# Patient Record
Sex: MALE | Race: WHITE | Employment: OTHER | URBAN - METROPOLITAN AREA
[De-identification: names, ages, dates, MRNs, and addresses within clinical notes are randomized per-mention and may not be internally consistent; named-entity substitution may affect disease eponyms.]

---

## 2021-11-22 ENCOUNTER — OFFICE VISIT (OUTPATIENT)
Dept: ORTHOPEDIC SURGERY | Age: 66
End: 2021-11-22
Payer: MEDICARE

## 2021-11-22 DIAGNOSIS — M79.641 RIGHT HAND PAIN: ICD-10-CM

## 2021-11-22 DIAGNOSIS — M65.311 TRIGGER FINGER OF RIGHT THUMB: Primary | ICD-10-CM

## 2021-11-22 PROCEDURE — 3017F COLORECTAL CA SCREEN DOC REV: CPT | Performed by: ORTHOPAEDIC SURGERY

## 2021-11-22 PROCEDURE — G8421 BMI NOT CALCULATED: HCPCS | Performed by: ORTHOPAEDIC SURGERY

## 2021-11-22 PROCEDURE — G8427 DOCREV CUR MEDS BY ELIG CLIN: HCPCS | Performed by: ORTHOPAEDIC SURGERY

## 2021-11-22 PROCEDURE — 99203 OFFICE O/P NEW LOW 30 MIN: CPT | Performed by: ORTHOPAEDIC SURGERY

## 2021-11-22 PROCEDURE — G8432 DEP SCR NOT DOC, RNG: HCPCS | Performed by: ORTHOPAEDIC SURGERY

## 2021-11-22 PROCEDURE — G8536 NO DOC ELDER MAL SCRN: HCPCS | Performed by: ORTHOPAEDIC SURGERY

## 2021-11-22 PROCEDURE — 1101F PT FALLS ASSESS-DOCD LE1/YR: CPT | Performed by: ORTHOPAEDIC SURGERY

## 2021-11-22 NOTE — PROGRESS NOTES
HPI: Shad Boswell (: 1955) is a 74 y.o. male, patient, here for evaluation of the following chief complaint(s):    New Patient (Right Trigger Thumb. Patient would like to discuss Cortisone Injection)  Patient is seen today to evaluate his right hand. He is a retired  and has been complaining of pain clicking and locking at night in his sleep involving his right thumb. He did not recall any specific fall or other injury. He localizes pain to the A1 pulley region or base of his thumb volarly. He denied any numbness or tingling is seen for further treatment of his right hand. Vitals: There were no vitals taken for this visit. There is no height or weight on file to calculate BMI. No Known Allergies    No current outpatient medications on file. No current facility-administered medications for this visit. History reviewed. No pertinent past medical history. History reviewed. No pertinent surgical history. Family History   Family history unknown: Yes        Social History     Tobacco Use    Smoking status: Never Smoker    Smokeless tobacco: Never Used   Substance Use Topics    Alcohol use: Never    Drug use: Not on file        Review of Systems    ROS     Negative for: Constitutional, Gastrointestinal, Neurological, Skin, Genitourinary, Musculoskeletal (Hand Pain), HENT, Endocrine, Cardiovascular, Eyes, Respiratory, Psychiatric, Allergic/Imm, Heme/Lymph    Last edited by Moiz Mendoza on 2021  2:41 PM. (History)             Physical Exam    Examination of the right wrist reveals no swelling, edema or warmth, no tenderness to palpation, no pain, normal strength and tone, normal range of motion, no crepitus, normal sensation, no instability or laxity and no known fractures or deformities.   Examination of the left wrist reveals no swelling, edema or warmth, no tenderness to palpation, no pain, normal strength and tone, normal range of motion, no crepitus, normal sensation, no instability or laxity and no known fractures or deformities. Examination of the left hand reveals no tenderness to palpation, no pain, normal  strength, normal tone, normal range of motion, no crepitus, no instability or laxity, no known fractures or deformities and normal sensation. Right hand is full range of motion with point tenderness at the A1 pulley with clicking and locking. Imaging:    XR Results (most recent):  Results from Appointment encounter on 11/22/21    XR HAND RT MIN 3 V    Narrative  3 x-ray views of the right hand AP, lateral oblique show mostly normal osseous and joint space findings. Ossicle to ulnar DIP of index and slight narrowing arthritic change beginning at ring MP joint but no profound arthritis of fracture. ASSESSMENT/PLAN:  Below is the assessment and plan developed based on review of pertinent history, physical exam, labs, studies, and medications. Patient's examination was consistent with right hand thumb stenosing tenosynovitis with clicking and locking. He tolerated injection therapy well on 11/22/2021. He plans to follow home stretching and gentle strengthening exercise program.  He understands the injection can be repeated in the future consideration given for A1 pulley release. Follow-up in 3 to 4 weeks. Discussed risks/benefits of cortisone injection and patient gave verbal consent. Under sterile conditions, the right Thumb flexor tendon sheath was injected with 1 cc 1% Lidocaine and 1cc 40mg Depo Medrol, tolerated the procedure well. 1. Trigger finger of right thumb  -     XR HAND RT MIN 3 V; Future  2. Right hand pain      Return in about 4 weeks (around 12/20/2021). An electronic signature was used to authenticate this note.   -- Angel Christian MD

## 2021-11-22 NOTE — PATIENT INSTRUCTIONS
Trigger Finger: Care Instructions  Overview  A trigger finger is a finger stuck in a bent position. It happens when the tendon that bends and straightens the thumb or finger can't slide smoothly under the ligaments that hold the tendon against the bones. In most cases, it's caused by a bump (nodule) that forms on the tendon. The bent finger usually straightens out on its own. A trigger finger can be painful. But it normally isn't a serious problem. Trigger fingers seem to occur more in some groups of people. These groups include:  · People who have diabetes or arthritis. · People who have injured their hands in the past.  · Musicians. · People who  tools often. Rest and exercises may help your finger relax so that it can bend. You may get a corticosteroid shot. This can reduce swelling and pain. Your doctor may put a splint on your finger. It will give your finger some rest. You may need surgery if the finger keeps locking in a bent position. Follow-up care is a key part of your treatment and safety. Be sure to make and go to all appointments, and call your doctor if you are having problems. It's also a good idea to know your test results and keep a list of the medicines you take. How can you care for yourself at home? · If your doctor put a splint on your finger, wear it as directed. Don't take it off until your doctor says you can. · You may need to change your activities to avoid movements that irritate the finger. · Take your medicines exactly as prescribed. Call your doctor if you think you are having a problem with your medicine. · Ask your doctor if you can take an over-the-counter pain medicine, such as acetaminophen (Tylenol), ibuprofen (Advil, Motrin), or naproxen (Aleve). Be safe with medicines. Read and follow all instructions on the label. · If your doctor recommends exercises, do them as directed. When should you call for help?    Call your doctor now or seek immediate medical care if:    · Your finger locks in a bent position and will not straighten. Watch closely for changes in your health, and be sure to contact your doctor if:    · You do not get better as expected. Where can you learn more? Go to http://www.[facility].com/  Enter M826 in the search box to learn more about \"Trigger Finger: Care Instructions. \"  Current as of: July 1, 2021               Content Version: 13.0  © 2006-2021 [employer]. Care instructions adapted under license by [employer] (which disclaims liability or warranty for this information). If you have questions about a medical condition or this instruction, always ask your healthcare professional. [address] any warranty or liability for your use of this information.

## 2022-12-07 ENCOUNTER — HOSPITAL ENCOUNTER (OUTPATIENT)
Dept: NON INVASIVE DIAGNOSTICS | Age: 67
Discharge: HOME | End: 2022-12-07
Attending: SPECIALIST | Admitting: SPECIALIST
Payer: MEDICARE

## 2022-12-07 VITALS
HEART RATE: 60 BPM | RESPIRATION RATE: 18 BRPM | BODY MASS INDEX: 28.35 KG/M2 | HEIGHT: 70 IN | WEIGHT: 198 LBS | SYSTOLIC BLOOD PRESSURE: 111 MMHG | OXYGEN SATURATION: 94 % | DIASTOLIC BLOOD PRESSURE: 76 MMHG

## 2022-12-07 DIAGNOSIS — I48.91 ATRIAL FIBRILLATION, UNSPECIFIED TYPE (HCC): ICD-10-CM

## 2022-12-07 PROCEDURE — 74011000250 HC RX REV CODE- 250: Performed by: SPECIALIST

## 2022-12-07 PROCEDURE — 74011000258 HC RX REV CODE- 258: Performed by: SPECIALIST

## 2022-12-07 PROCEDURE — 99152 MOD SED SAME PHYS/QHP 5/>YRS: CPT

## 2022-12-07 PROCEDURE — 77030018729 HC ELECTRD DEFIB PAD CARD -B

## 2022-12-07 PROCEDURE — 92960 CARDIOVERSION ELECTRIC EXT: CPT

## 2022-12-07 RX ORDER — METOPROLOL SUCCINATE 50 MG/1
TABLET, EXTENDED RELEASE ORAL DAILY
COMMUNITY

## 2022-12-07 RX ORDER — TAMSULOSIN HYDROCHLORIDE 0.4 MG/1
0.4 CAPSULE ORAL DAILY
COMMUNITY

## 2022-12-07 RX ADMIN — METHOHEXITAL SODIUM 50 MG: 500 INJECTION, POWDER, LYOPHILIZED, FOR SOLUTION INTRAMUSCULAR; INTRAVENOUS; RECTAL at 10:35

## 2022-12-07 NOTE — DISCHARGE INSTRUCTIONS
Follow up with Milagros Arita MD on December 21st, 2022 at 2pm.  [address] [facility] [place] [facility]  [phone]

## 2022-12-07 NOTE — H&P
Date of Surgery Update:  Suzan Zavala was seen and examined. History and physical has been reviewed. The patient has been examined. There have been no significant clinical changes since the completion of the originally dated History and Physical.    Signed By: Tl Yates MD     December 7, 2022 10:33 AM       PHILOMENA      Tiny Laura 1955 Default Progress NoteVisit Date: Fri, Dec 2, 2022 10:40 amProvider: Jennifer Vidal MD (Assistant: Amanda Kelley, [address] [place] [address] )Location: [facility] AT [facility] ([facility])- [address] [phone] signed by Melissa Velazquez MD on  12/02/2022 10:59:07 AM                         Subjective:CC: Mr. Bryan Strong is a 69year old White male. His primary care physician is [address]. This is a 3 week follow-up visit. He presents today with a complaint of atrial flutter. He has a history of atrial flutter. HPI:   Atrial Fibrillation:MD Notes: Patient is planning a biopsy of his prostate with Dr. Alka Pillai, date TBD. Currently on hold. Regarding typical aflutter: His [id number] score is 1. He has not been aware of any disturbance in heart rhythm; he specifically denies an irregular heartbeat, chest pain, shortness of breath, pedal edema and bleeding. A transthoracic ECHO has been done ( performed 11/11/22; official interpretation shows Normal LV systolic function with an estimated ejection fraction of 55-60%. There is mild left ventricular hypertrophy. The right ventricular dimension is dilated. .  The left atrium is mildly enlarged. The right atrium is moderately enlarged. There is trace aortic regurgitation. There is mild mitral regurgitation. There is mild to moderate tricuspid regurgitation. with mild pulmonary hypertension. There is trace pulmonary regurgitation. Atrial septal aneurysm with no shunt on color Doppler study. ). Reviewed EKG done today.   Past Medical History / Family History / Social History: Last Reviewed on 2022 10:45 AM by Nkechi Watts History: OTHER (enter) Hyperthyroidism INFLUENZA VACCINE: was last done 10/2022 COVID-19 VACCINE: modernax5 Past Cardiac Procedures/Tests:Echocardiogram on 22 - Normal LV systolic function with an estimated ejection fraction of 55-60%. There is mild left ventricular hypertrophy. The right ventricular dimension is dilated. .  The left atrium is mildly enlarged. The right atrium is moderately enlarged. There is trace aortic regurgitation. There is mild mitral regurgitation. There is mild to moderate tricuspid regurgitation. with mild pulmonary hypertension. There is trace pulmonary regurgitation. Atrial septal aneurysm with no shunt on color Doppler study. .  Surgical History: Surgical/Procedural History: NONE Family History: Father: ; Mother: Healthy; ; Social History: Social History: Occupation: Retired Marital Status:  Children: 2 children Tobacco/Alcohol/Supplements: Last Reviewed on 2022 10:45 AM by Karli NunezcaTOBACCO/ALCOHOL/SUPPLEMENTS Tobacco: He has never smoked. Alcohol: Patient has a history of alcoholism in the past.  Caffeine:  He admits to consuming caffeine via coffee ( 1 serving per day ). Substance Abuse History: Last Reviewed on 2022 10:45 AM by Eliz Chen Health History: Last Reviewed on 2022 10:45 AM by Karen NunezCommunicable Diseases (eg STDs): Last Reviewed on 2022 10:45 AM by Karen NunezCurrent Problems: Last Reviewed on 2022 10:45 AM by Karen NunezAtrial flutterTypical atrial flutterUnspecified atrial flutterAllergies: Last Reviewed on 2022 10:45 AM by Skylar Nunez Known Allergies. Current Medications: Last Reviewed on 2022 10:45 AM by Karli Nunezcalevothyroxine  [1 tablet daily (pt is unsure of dosage)]tamsulosin 0.4 mg oral capsule [take 1 capsule (0.4 mg) by oral route once daily 1/2 hour following the same meal each day]Xarelto 20 mg oral tablet [take 1 tablet (20 mg) by oral route once daily with the evening meal]metoprolol succinate 50 mg oral Tablet, Extended Release 24 hr [take 1 tablet (50 mg) by oral route once daily]Objective:Vitals: Historical: 11/11/2022  BP:   136/86 mm Hg ( (left arm, , standing, );) 11/11/2022  BP:   143/86 mm Hg ( (left arm, , sitting, );) 11/11/2022  Wt:   198lbsCurrent: 12/2/2022 10:44:17 AMHt:  5 ft, 10 in; Wt: 198 lbs;  BMI: 28. 4BP: 122/80 mm Hg (left arm, sitting);  P: 62 bpmRepeat: 10:44:29 AM  BP:   127/77mm Hg (left arm, sitting) Exams: GENERAL:  Alert, oriented to person, place and time. HEENT:  Pinkish palpebral  conjunctivae. Anicteric sclerae. NECK:  No jugular vein engorgement. No bruit. CHEST: Equal expansion. Clear breath sounds. No rales, no wheezing. Heart: Irregular rhythm. Grade 2/6 systolic ejection murmur at the left sternal border area. ABDOMEN:  Soft. Normal active bowel sounds. No tenderness. EXTREMITIES:  No pitting pedal edema. Equal pulses bilaterally. NEURO:  Grossly intact. Procedures: Unspecified atrial flutterECG INTERPRETATION: See scanned EKG for results. Assessment: I48.92   Unspecified atrial flutter   I48. 3   Typical atrial flutter   ORDERS: Procedures Ordered:   [id number]  Education and train for pt self-mgmt by qualified, nonphysician, ea 30 minutes; individual pt  (Send-Out)        [id number]  Electrocardiogram, routine with at least 12 leads; with interpretation and report  (In-House)          RFCARD  Cardiologist Referral  (Send-Out)      Other Orders:   [id number]  Queried Patient for Tobacco Use  (Send-Out)          ELIAZAR ONEAL  (Send-Out)          Isabel Goldman  (Send-Out)      Plan: Unspecified atrial flutter  Orders:     [id number]  Electrocardiogram, routine with at least 12 leads; with interpretation and report  (In-House)      Typical atrial flutter*  Plan of care for atrial fibrillation: Follow up visit CHADS score remains greater than 1. He is tolerating a rate/rhythm control  with prescribed medications. The patient remains anticoagulated with Xarelto, patient continues to tolerate this medication. .  1.  Medication list has been reviewed. Continue current medications. Smoking Status:  Nonsmoker 2. Advised the patient regarding diet, exercise, and lifestyle modification. 3.  The patient to call the office if there is any change in his cardiac symptoms. 4.  Explained to the patient the importance of controlling his cardiac risk factors. Testing/Procedures:Cardioversion - to be done to be done at at [facility].  Schedule a follow up appointment in 2 weeks. Referrals:  I am referring Mr. Yancy Willard to an electrophysiologist: Dr. Ronan Mccann for possible ablation of a-fib. .  The above note was transcribed by Lexy Noel and authenticated by Dr. Stacey Little prior to sign off. Orders:     ELIAZAR ONEAL  (Send-Out)          Jenni Saba  (Send-Out)          RFCARD  Cardiologist Referral  (Send-Out)      Other Orders    [id number]  Queried Patient for Tobacco Use  (Send-Out)          10705  Education and train for pt self-mgmt by qualified, nonphysician, ea 30 minutes; individual pt  (Send-Out)      Patient Recommendations: For  Typical atrial flutter:*  Plan of care for atrial fibrillation: Please continue your anticoagulation with Xarelto. 1.  Your medication list has been reviewed. 2.  You have been advised regarding diet, exercise, and lifestyle. modification. 3.  Please call the office if there is any change in your cardiac symptoms. 4.  Explained to you the importance of controlling your cardiac risk factors. You need to have the following test/procedure(s) done: Cardioversion to be done at at [facility] Schedule a follow-up visit in 2 weeks. Referrals:I would like you to make appointment with an electrophysiologist, Dr. Ronan Mccann for possible ablation of a-fib.

## 2022-12-07 NOTE — PROGRESS NOTES
Patient arrived to Non-Invasive Cardiology Lab for Out Patient DCCV Procedure. Staff introduced to patient. Patient identifiers verified with Name and Date of Birth. Procedure verified with patient. Consent forms reviewed and signed by patient or authorized representative and verified. Allergies verified. Patient informed of procedure and plan of care. Questions answered with review. Patient on cardiac monitor, non-invasive blood pressure, SPO2 monitor. Patient is A&Ox3. Patient reports no complaints. Patient belongings and valuables to remain in the room with patient. Patient on stretcher, in low position, with side rails up and brakes locked. Patient instructed to call for assistance as needed. Family in waiting room.

## 2022-12-07 NOTE — PROCEDURES
Indication:  Atrial flutter. Procedure:  Informed consent was obtained. The patient was anesthetized with 0.6mg/kg brevital.  The patient was converted from atrial flutter to normal sinus rhythm with a single biphasic shock of 200 joules. The patient tolerated the procedure well without obvious complications and was transferred to the holding room in stable condition. Summary:  Successful DCCV of atrial flutter to NSR.     F/U with Dr. Luis Montenegro 12/21/22 @ 2pm.

## 2023-03-28 ENCOUNTER — HOSPITAL ENCOUNTER (OUTPATIENT)
Dept: PREADMISSION TESTING | Age: 68
Discharge: HOME | End: 2023-03-28
Payer: MEDICARE

## 2023-03-28 ENCOUNTER — HOSPITAL ENCOUNTER (OUTPATIENT)
Dept: GENERAL RADIOLOGY | Age: 68
Discharge: HOME | End: 2023-03-28
Attending: UROLOGY
Payer: MEDICARE

## 2023-03-28 VITALS
BODY MASS INDEX: 27.36 KG/M2 | HEIGHT: 70 IN | TEMPERATURE: 98.1 F | SYSTOLIC BLOOD PRESSURE: 131 MMHG | WEIGHT: 191.14 LBS | DIASTOLIC BLOOD PRESSURE: 71 MMHG | HEART RATE: 49 BPM

## 2023-03-28 LAB
ALBUMIN SERPL-MCNC: 4 G/DL (ref 3.5–5)
ALBUMIN/GLOB SERPL: 1.6 (ref 1.1–2.2)
ALP SERPL-CCNC: 85 U/L (ref 45–117)
ALT SERPL-CCNC: 34 U/L (ref 12–78)
ANION GAP SERPL CALC-SCNC: 2 MMOL/L (ref 5–15)
APPEARANCE UR: CLEAR
AST SERPL-CCNC: 29 U/L (ref 15–37)
BACTERIA URNS QL MICRO: NEGATIVE /HPF
BASOPHILS # BLD: 0 K/UL (ref 0–0.1)
BASOPHILS NFR BLD: 1 % (ref 0–1)
BILIRUB SERPL-MCNC: 0.4 MG/DL (ref 0.2–1)
BILIRUB UR QL: NEGATIVE
BUN SERPL-MCNC: 20 MG/DL (ref 6–20)
BUN/CREAT SERPL: 19 (ref 12–20)
CALCIUM SERPL-MCNC: 9.5 MG/DL (ref 8.5–10.1)
CHLORIDE SERPL-SCNC: 108 MMOL/L (ref 97–108)
CO2 SERPL-SCNC: 29 MMOL/L (ref 21–32)
COLOR UR: NORMAL
CREAT SERPL-MCNC: 1.08 MG/DL (ref 0.7–1.3)
DIFFERENTIAL METHOD BLD: NORMAL
EOSINOPHIL # BLD: 0.1 K/UL (ref 0–0.4)
EOSINOPHIL NFR BLD: 2 % (ref 0–7)
EPITH CASTS URNS QL MICRO: NORMAL /LPF
ERYTHROCYTE [DISTWIDTH] IN BLOOD BY AUTOMATED COUNT: 13.1 % (ref 11.5–14.5)
GLOBULIN SER CALC-MCNC: 2.5 G/DL (ref 2–4)
GLUCOSE SERPL-MCNC: 105 MG/DL (ref 65–100)
GLUCOSE UR STRIP.AUTO-MCNC: NEGATIVE MG/DL
HCT VFR BLD AUTO: 40.5 % (ref 36.6–50.3)
HGB BLD-MCNC: 13.2 G/DL (ref 12.1–17)
HGB UR QL STRIP: NEGATIVE
HISTORY CHECKED?,CKHIST: NORMAL
HYALINE CASTS URNS QL MICRO: NORMAL /LPF (ref 0–5)
IMM GRANULOCYTES # BLD AUTO: 0 K/UL (ref 0–0.04)
IMM GRANULOCYTES NFR BLD AUTO: 0 % (ref 0–0.5)
KETONES UR QL STRIP.AUTO: NEGATIVE MG/DL
LEUKOCYTE ESTERASE UR QL STRIP.AUTO: NEGATIVE
LYMPHOCYTES # BLD: 1.5 K/UL (ref 0.8–3.5)
LYMPHOCYTES NFR BLD: 26 % (ref 12–49)
MCH RBC QN AUTO: 30.7 PG (ref 26–34)
MCHC RBC AUTO-ENTMCNC: 32.6 G/DL (ref 30–36.5)
MCV RBC AUTO: 94.2 FL (ref 80–99)
MONOCYTES # BLD: 0.4 K/UL (ref 0–1)
MONOCYTES NFR BLD: 6 % (ref 5–13)
NEUTS SEG # BLD: 3.7 K/UL (ref 1.8–8)
NEUTS SEG NFR BLD: 65 % (ref 32–75)
NITRITE UR QL STRIP.AUTO: NEGATIVE
NRBC # BLD: 0 K/UL (ref 0–0.01)
NRBC BLD-RTO: 0 PER 100 WBC
PH UR STRIP: 7 (ref 5–8)
PLATELET # BLD AUTO: 192 K/UL (ref 150–400)
PMV BLD AUTO: 10 FL (ref 8.9–12.9)
POTASSIUM SERPL-SCNC: 4.4 MMOL/L (ref 3.5–5.1)
PROT SERPL-MCNC: 6.5 G/DL (ref 6.4–8.2)
PROT UR STRIP-MCNC: NEGATIVE MG/DL
RBC # BLD AUTO: 4.3 M/UL (ref 4.1–5.7)
RBC #/AREA URNS HPF: NORMAL /HPF (ref 0–5)
SODIUM SERPL-SCNC: 139 MMOL/L (ref 136–145)
SP GR UR REFRACTOMETRY: 1.01 (ref 1–1.03)
UA: UC IF INDICATED,UAUC: NORMAL
UROBILINOGEN UR QL STRIP.AUTO: 0.2 EU/DL (ref 0.2–1)
WBC # BLD AUTO: 5.8 K/UL (ref 4.1–11.1)
WBC URNS QL MICRO: NORMAL /HPF (ref 0–4)

## 2023-03-28 PROCEDURE — 85025 COMPLETE CBC W/AUTO DIFF WBC: CPT

## 2023-03-28 PROCEDURE — 81001 URINALYSIS AUTO W/SCOPE: CPT

## 2023-03-28 PROCEDURE — 80053 COMPREHEN METABOLIC PANEL: CPT

## 2023-03-28 PROCEDURE — 71046 X-RAY EXAM CHEST 2 VIEWS: CPT

## 2023-03-28 PROCEDURE — 86900 BLOOD TYPING SEROLOGIC ABO: CPT

## 2023-03-28 PROCEDURE — 36415 COLL VENOUS BLD VENIPUNCTURE: CPT

## 2023-03-28 RX ORDER — SODIUM CHLORIDE 9 MG/ML
250 INJECTION, SOLUTION INTRAVENOUS AS NEEDED
OUTPATIENT
Start: 2023-03-28

## 2023-03-28 NOTE — PERIOP NOTES
[address] INSTRUCTIONS    Surgery Date:   4/11/2023    Your surgeon's office or [facility] staff will call you between 4 PM- 8 PM the day before surgery with your arrival time. If your surgery is on a Monday, you will receive a call the preceding Friday. Please report to [facility] Patient Access/Admitting on the 1st floor. Bring your insurance card, photo identification, and any copayment ( if applicable). If you are going home the same day of your surgery, you must have a responsible adult to drive you home. You need to have a responsible adult to stay with you the first 24 hours after surgery and you should not drive a car for 24 hours following your surgery. Nothing to eat or drink after midnight the night before surgery. This includes no water, gum, mints, coffee, juice, etc.  Please note special instructions, if applicable, below for medications. Do NOT drink alcohol or smoke 24 hours before surgery. STOP smoking for 14 days prior as it helps with breathing and healing after surgery. If you are being admitted to the hospital, please leave personal belongings/luggage in your car until you have an assigned hospital room number. Please wear comfortable clothes. Wear your glasses instead of contacts. We ask that all money, jewelry and valuables be left at home. Wear no make up, particularly mascara, the day of surgery. All body piercings, rings, and jewelry need to be removed and left at home. Please remove any nail polish or artificial nails from your fingernails. Please wear your hair loose or down. Please no pony-tails, buns, or any metal hair accessories. If you shower the morning of surgery, please do not apply any lotions or powders afterwards. You may wear deodorant, unless having breast surgery. Do not shave any body area within 24 hours of your surgery. Please follow all instructions to avoid any potential surgical cancellation.   Should your physical condition change, (i.e. fever, cold, flu, etc.) please notify your surgeon as soon as possible. It is important to be on time. If a situation occurs where you may be delayed, please call:  [phone] / [phone] on the day of surgery. The Preadmission Testing staff can be reached at [phone]. Special instructions: N/A      Current Outpatient Medications   Medication Sig    docosahexaenoic acid/epa (FISH OIL PO) Take 1 Capsule by mouth Every morning. levothyroxine sodium (LEVOTHYROXINE PO) Take 75 mcg by mouth Every morning. TAKES AT 2 AM    tamsulosin (FLOMAX) 0.4 mg capsule Take 0.4 mg by mouth nightly. No current facility-administered medications for this encounter. YOU MUST ONLY TAKE THESE MEDICATIONS THE MORNING OF SURGERY WITH A SIP OF WATER: LEVOTHYROXINE (2AM)  MEDICATIONS TO TAKE THE MORNING OF SURGERY ONLY IF NEEDED: N/A  HOLD these prescription medications BEFORE Surgery: N/A  Ask your surgeon/prescribing physician about when/if to STOP taking these medications: N/A  Stop all vitamins, herbal medicines and Aspirin containing products 7 days prior to surgery. Stop any non-steroidal anti-inflammatory drugs (i.e. Ibuprofen, Naproxen, Advil, Aleve) 3 days before surgery. You may take Tylenol. If you are currently taking Plavix, Coumadin, or any other blood-thinning/anticoagulant medication contact your prescribing physician for instructions. Preventing Infections Before and After - Your Surgery    IMPORTANT INSTRUCTIONS      You play an important role in your health and preparation for surgery. To reduce the germs on your skin you will need to shower with CHG soap (Chorhexidine gluconate 4%) two times before surgery. CHG soap (Hibiclens, Hex-A-Clens or store brand)  CHG soap will be provided at your Preadmission Testing (PAT) appointment.   If you do not have a PAT appointment before surgery, you may arrange to  CHG soap from our office or purchase CHG soap at a pharmacy, grocery or department store. You need to purchase TWO 4 ounce bottles to use for your 2 showers. Steps to follow:  [place] your hair with your normal shampoo and your body with regular soap and rinse well to remove shampoo and soap from your skin. Wet a clean washcloth and turn off the shower. Put CHG soap on washcloth and apply to your entire body from the neck down. Do not use on your head, face or private parts(genitals). Do not use CHG soap on open sores, wounds or areas of skin irritation. Wash you body gently for 5 minutes. Do not wash your skin too hard. This soap does not create lather. Pay special attention to your underarms and from your belly button to your feet. Turn the shower back on and rinse well to get CHG soap off your body. Pat your skin dry with a clean, dry towel. Do not apply lotions or moisturizer. Put on clean clothes and sleep on fresh bed sheets and do not allow pets to sleep with you. Shower with CHG soap 2 times before your surgery  The evening before your surgery  The morning of your surgery      Tips to help prevent infections after your surgery:  Protect your surgical wound from germs:  Hand washing is the most important thing you and your caregivers can do to prevent infections. Keep your bandage clean and dry! Do not touch your surgical wound. Use clean, freshly washed towels and washcloths every time you shower; do not share bath linens with others. Until your surgical wound is healed, wear clothing and sleep on bed linens each day that are clean. Do not allow pets to sleep in your bed with you or touch your surgical wound. Do not smoke - smoking delays wound healing. This may be a good time to stop smoking. If you have diabetes, it is important for you to manage your blood sugar levels properly before your surgery as well as after your surgery. Poorly managed blood sugar levels slow down wound healing and prevent you from healing completely.           Patient Information Regarding COVID Restrictions    Day of Procedure    Please park in the parking deck or any designated visitor parking lot. Enter the facility through the Main Entrance of the hospital.  On the day of surgery, please provide the cell phone number of the person who will be waiting for you to the Patient Access representative at the time of registration. Masks are highly recommended in the hospital, but not required. Once your procedure and the immediate recovery period is completed, a nurse in the recovery area will contact your designated visitor to inform them of your room number or to otherwise review other pertinent information regarding your care. Social distancing practices are strongly encouraged in waiting areas and the cafeteria. The patient was contacted  in person. He verbalized understanding of all instructions does not  need reinforcement.

## 2023-03-28 NOTE — PERIOP NOTES
COVID-19 VACCINE COPY ATTACHED TO CHART. PATIENT INSTRUCTED TO HAVE CXR AFTER LEAVING PAT DEPT.    3/28/23 @ 1278 - SPOKE WITH ANGELIKA AT DR. Manoj Byrd, CARDIOLOGIST OFFICE. REQUESTED MOST RECENT OFFICE NOTES, EKG AND TEST(S) TO BE FAXED TO [facility] DEPT.

## 2023-03-29 LAB
ABO + RH BLD: NORMAL
BLOOD GROUP ANTIBODIES SERPL: NORMAL
SPECIMEN EXP DATE BLD: NORMAL

## 2023-04-11 PROBLEM — C61 PROSTATE CANCER (HCC): Status: ACTIVE | Noted: 2023-04-11
